# Patient Record
Sex: MALE | Race: WHITE | NOT HISPANIC OR LATINO | URBAN - METROPOLITAN AREA
[De-identification: names, ages, dates, MRNs, and addresses within clinical notes are randomized per-mention and may not be internally consistent; named-entity substitution may affect disease eponyms.]

---

## 2017-05-24 ENCOUNTER — EMERGENCY (EMERGENCY)
Facility: HOSPITAL | Age: 52
LOS: 1 days | Discharge: HOME | End: 2017-05-24
Attending: EMERGENCY MEDICINE | Admitting: EMERGENCY MEDICINE
Payer: MEDICAID

## 2017-05-24 VITALS
RESPIRATION RATE: 18 BRPM | HEART RATE: 92 BPM | TEMPERATURE: 99 F | DIASTOLIC BLOOD PRESSURE: 92 MMHG | SYSTOLIC BLOOD PRESSURE: 157 MMHG | WEIGHT: 156.97 LBS | OXYGEN SATURATION: 97 %

## 2017-05-24 DIAGNOSIS — L30.9 DERMATITIS, UNSPECIFIED: ICD-10-CM

## 2017-05-24 DIAGNOSIS — Z79.899 OTHER LONG TERM (CURRENT) DRUG THERAPY: ICD-10-CM

## 2017-05-24 DIAGNOSIS — R21 RASH AND OTHER NONSPECIFIC SKIN ERUPTION: ICD-10-CM

## 2017-05-24 PROCEDURE — 99283 EMERGENCY DEPT VISIT LOW MDM: CPT

## 2017-05-24 NOTE — ED PROVIDER NOTE - OBJECTIVE STATEMENT
pt to ed co rash over arms and hands peeling cracked and excoriated no bleeding no infection no DC going on for several months but worse after recent job as  using water no fever no dizzy no headache no chills no NVD no chest pain no SOB no shakes no aches no other  injury no other complaints

## 2017-05-24 NOTE — ED PROVIDER NOTE - MEDICAL DECISION MAKING DETAILS
pt with eczema pt will get steroid lotion and FU derm I have discussed the discharge plan with the patient. The patient agrees with the plan, as discussed.  The patient understands Emergency Department diagnosis is a preliminary diagnosis often based on limited information and that the patient must adhere to the follow-up plan as discussed.  The patient understands that if the symptoms worsen or if prescribed medications do not have the desired/planned effect that the patient may return to the Emergency Department at any time for further evaluation and treatment.

## 2017-05-24 NOTE — ED ADULT NURSE NOTE - OBJECTIVE STATEMENT
c/o of bilateral rash to hands x 1 month. Within the last week pt reports that skin has been peeling to hands.  pt also reports rash can be irritating and itching.  + rash, redness to bilateral hands.  + skin peeling noted to palms.

## 2017-05-24 NOTE — ED ADULT TRIAGE NOTE - CHIEF COMPLAINT QUOTE
worsening rash to bilateral arms and healing sores to bilateral palms x 1 month.  Rash started on bilateral hands 1 month ago and spread up bilateral arms a few days ago.  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  Denies recent travels, fever, chills, neck pain, cough, sore throat, sob, chest pain.  No active bleeding / discharge.

## 2017-07-31 NOTE — ED PROVIDER NOTE - NS ED NOTE AC HIGH RISK COUNTRIES
[facility], [place]   Psychiatric Progress Note        Interim History:   The patient's care was discussed with the treatment team during the daily team meeting and/or staff's chart notes were reviewed.  Staff reports patient has been sleeping poorly past 2 nights but naps during the day. Rash subsided.  Responding to internal stimuli and conversing with self. Bout of irritability but no major outbursts. Compliant with Latuda but refused Seroquel and hesitant about PRNs. Paranoia and suspicious.  Preoccupied and generally keep to self. Denied SI, LOYDA and HI.  Independent with [facility].     The patient was sleeping nataliia approached. Noted that he is feeling better. Denied all symptoms but noted feeling tired. Agreed to increase Latuda but requested to changes Seroquel at bedtime to PRN. Advised to improved sleep hygiene. Also requested to get off corticosteroids as rash resolving. Denied dep and anxiety. Denied racing thoughts but acknowledged being irritable past couple of days. Denied hallucinations and paranoia.  Appetite improved.     Dicussed medications and care plan.          lurasidone  80 mg Oral Daily     predniSONE  60 mg Oral Daily    Followed by     [START ON 8/7/2017] predniSONE  50 mg Oral Daily    Followed by     [START ON 8/17/2017] predniSONE  40 mg Oral Daily    Followed by     [START ON 8/27/2017] predniSONE  30 mg Oral Daily    Followed by     [START ON 9/6/2017] predniSONE  20 mg Oral Daily    Followed by     [START ON 9/16/2017] predniSONE  10 mg Oral Daily    Followed by     [START ON 9/26/2017] predniSONE  5 mg Oral Daily     lisinopril  20 mg Oral Daily     multivitamin, therapeutic  1 tablet Oral Daily     QUEtiapine  200 mg Oral At Bedtime     triamcinolone   Topical BID          Allergies:     Allergies   Allergen Reactions     Carbamazepine Rash     Carbamazepine (Tegretol),.....Rash.          Labs:     Recent Results (from the past 24 hour(s))   Comprehensive  No metabolic panel    Collection Time: 07/31/17  7:40 AM   Result Value Ref Range    Sodium 142 133 - 144 mmol/L    Potassium 4.1 3.4 - 5.3 mmol/L    Chloride 108 94 - 109 mmol/L    Carbon Dioxide 22 20 - 32 mmol/L    Anion Gap 12 3 - 14 mmol/L    Glucose 85 70 - 99 mg/dL    Urea Nitrogen 18 7 - 30 mg/dL    Creatinine 0.59 (L) 0.66 - 1.25 mg/dL    GFR Estimate >90  Non  GFR Calc   >60 mL/min/1.7m2    GFR Estimate If Black >90   GFR Calc   >60 mL/min/1.7m2    Calcium 8.8 8.5 - 10.1 mg/dL    Bilirubin Total 0.7 0.2 - 1.3 mg/dL    Albumin 3.7 3.4 - 5.0 g/dL    Protein Total 6.5 (L) 6.8 - 8.8 g/dL    Alkaline Phosphatase 109 40 - 150 U/L    ALT 80 (H) 0 - 70 U/L    AST 21 0 - 45 U/L   CBC with platelets differential    Collection Time: 07/31/17  7:40 AM   Result Value Ref Range    WBC 10.9 4.0 - 11.0 10e9/L    RBC Count 5.20 4.4 - 5.9 10e12/L    Hemoglobin 15.1 13.3 - 17.7 g/dL    Hematocrit 45.8 40.0 - 53.0 %    MCV 88 78 - 100 fl    MCH 29.0 26.5 - 33.0 pg    MCHC 33.0 31.5 - 36.5 g/dL    RDW 13.4 10.0 - 15.0 %    Platelet Count 292 150 - 450 10e9/L    Diff Method Automated Method     % Neutrophils 33.3 %    % Lymphocytes 48.8 %    % Monocytes 12.4 %    % Eosinophils 4.5 %    % Basophils 0.4 %    % Immature Granulocytes 0.6 %    Nucleated RBCs 0 0 /100    Absolute Neutrophil 3.6 1.6 - 8.3 10e9/L    Absolute Lymphocytes 5.3 0.8 - 5.3 10e9/L    Absolute Monocytes 1.3 0.0 - 1.3 10e9/L    Absolute Eosinophils 0.5 0.0 - 0.7 10e9/L    Absolute Basophils 0.0 0.0 - 0.2 10e9/L    Abs Immature Granulocytes 0.1 0 - 0.4 10e9/L    Absolute Nucleated RBC 0.0           Psychiatric Examination:     Vitals:    07/28/17 1300 07/29/17 0800 07/30/17 0800 07/31/17 0945   BP:       Pulse:       Resp:  14 16    Temp: 99.3  F (37.4  C) 99.3  F (37.4  C) 97.6  F (36.4  C) 97.5  F (36.4  C)   TempSrc:    Tympanic   Weight:  106.6 kg (235 lb)         Weight is 234 lbs 15.99 oz  Body mass index is 29.37  kg/(m^2).    Appearance: awake, alert, dressed in hospital scrubs, appeared as age stated and mild distress  Attitude:  cooperative and guarded  Eye Contact:  good  Mood:  better and irritable.   Affect:  intensity is blunted, guarded and restricted range  Speech:  clear, coherent and normal prosody  Psychomotor Behavior:  no evidence of tardive dyskinesia, dystonia, or tics and intact station, gait and muscle tone  Throught Process:  linear and goal oriented  Associations:  no loose associations  Thought Content:  no evidence of suicidal ideation or homicidal ideation, no auditory hallucinations present, no visual hallucinations present and paranoia   Insight:  fair  Judgement:  fair  Oriented to:  time, person, and place  Attention Span and Concentration:  intact  Recent and Remote Memory:  intact    Fund of Knowledge: Adequate         Precautions:     Behavioral Orders   Procedures     Assault precautions     Code 1 - Restrict to Unit     Elopement precautions     Routine Programming     As clinically indicated     Status 15     Every 15 minutes.          Diagnoses:     1.  Schizophrenia, paranoid type. (r/o schizoaffective Bipolar type)   2.  Noncompliance, nonadherence.   3.  Cluster B with antisocial traits.  4.  DRESS, rash improved and LFT normalizing.            Plan:     Medications:  -- Saphris: was tapered off due to lack of efficacy.  -- PRNs: Vistaril for anxiety and Seroquel for restlessness and haldol with Benadryl for agitation and psychosis. PRN ativan was discontinued. Seroquel 200 mg qhs PRN for racing thoughts and insomnia.   -- Seroquel 200 mg qHS but later but continued -200 mg BID PRN for psychosis, restlessness and insomnia.    -- Latuda: started and titrated to 120 mg daily. The medication was helpful initially but later mood lability and paranoia re-emerged. Medications was gradually lowered to 40 mg daily with plan to cross taper it with Risperdal. Plan to re-titrating to a target  dose of 120-160 mg. Dosed was increased to 100 mg daily on 8/1.   -- Risperdal was started and showed great efficacy but patient later developed a skin rash. Risperdal was eventually discontinued.  The patient declined to reconsider Risperdal in future.    -- Tegretol: started and titrated to 200 mg BID, level was 5.1 on 6/11 and 6.9 on 6/16 and 5.5 on 7/12. The patient developed a rash which failed to resolved after d/c'ing Risperdal. Tegretol was suspected and discontinued on 7/20.   -- Cogentin was added at 0.5-1 mg BID PRN for EPS.     -- IM team continues to follow and monitor re: DRESS.     Legal Status and Disposition:  1.  Commitment with [facility] for Saphris, Zyprexa, haloperidol, Latuda, and Risperidone. Letter was sent to add Seroquel and remove Risperdal and Saphris from [facility]  2.  [facility] placement. Given his statements about homicidal thoughts toward neighbor. May consider discharge home if safety established. [facility] informed local police department of the threats he made earlier in the hospital stay.

## 2024-08-17 NOTE — ED ADULT TRIAGE NOTE - WEIGHT IN KG
[facility] Infectious Disease Progress Note    Dyllan Boogie Patient Status:  Inpatient    1974 MRN [id number]   Location [address] Attending Malika Giles DO   Hosp Day # 4 PCP James De La Paz DO         ASSESSMENT:   - Grp A Strep bacteremia, 1/2 blood cx   - DM2 foot ulcer (left) with clinical osteo  - Dry gangrene to right second toe  - Fever  - Acute N/V  - Recent LLE abscess s/p OR with Dr. Romberg on 3/4 and 3/11 with graft placement and s/p L hallux amp, S/P TMA 3/15.  - DM  - Acute on CKD s/p renal transplant  (on tacrolimus and mycophenolate)  - immunocompromised host    PLAN:   - continue IV Vanco, start Meropenem  for MDR Klebsiella  - stop Cefepime, Flagyl  - Wcx  MRSA, group A strep, Rare proteus mirabilis  - wound cx  with MDR Klebsiella, Proteus, Grp A strep, mixed aerobes  - F/u Bcx from admit - Group A strep in 1/2 blood cx from   ---> repeat blood cx from  NGTD.  - f/u OR cx   - ECHO negative for vegetation, may need PATRICIA if additional Bcx positive.   - MRI left foot w/ osteomyelitis  - f/u path  - appreciate input of Dr. Rebollar  - Wound care per Dr. Romberg  - CTM labs/vitals  - labs/imaging/micro reviewed  - d/w ID attending  - will follow        Subjective:  Pt seen and examined  No fevers, stable   S/p TMA revision         Past Medical History  Past Medical History:   Diagnosis Date    Anemia     Blind right eye     Chronic kidney disease     Dyslipidemia 2022    Essential hypertension 2019    Hypercalcemia 2022    Kidney transplant recipient (CMD) 2021    Retinopathy due to secondary diabetes  (CMD)     Type 2 diabetes mellitus  (CMD) 2022        Surgical History  Past Surgical History:   Procedure Laterality Date    Amputation Left 2019    All 5 toes amputation    Appendectomy      Av fistula placement Left     Eye surgery      Kidney transplant      Retinopathy surgery Right      Rotator cuff repair Left 2012    Toe amputation Right 2023    5th toe        Social History  Social History     Tobacco Use    Smoking status: Former     Current packs/day: 0.00     Average packs/day: 0.3 packs/day for 17.0 years (5.1 ttl pk-yrs)     Types: Cigarettes     Start date:      Quit date:      Years since quittin.6     Passive exposure: Past    Smokeless tobacco: Never   Vaping Use    Vaping status: never used   Substance Use Topics    Alcohol use: Yes     Comment: occasionally 3-4 times yearly - 2024    Drug use: Yes     Types: Marijuana     Comment: occasionally - 2 weeks ago       Family History    Family History   Problem Relation Age of Onset    Diabetes Mother     Hypertension Mother     COPD Mother     COPD Father     Diabetes Father     Kidney disease Father     Coronary Artery Disease Father     Multiple myeloma Father     Dialysis/ESRD Father     Diabetes Brother        Allergies:  ALLERGIES:   Allergen Reactions    Beef Allergy   (Food Or Med) Other (See Comments)     Pt preference    Pork Allergy   (Food Or Med) Other (See Comments)     Pt preference       Medications:  Current Facility-Administered Medications   Medication Dose Route Frequency Provider Last Rate Last Admin    insulin lispro (ADMELOG, HumaLOG) injection 5 Units  5 Units Subcutaneous TID  Juanita Worrell PA-C   5 Units at 24 1212    insulin glargine (LANTUS) injection 15 Units  15 Units Subcutaneous Nightly Juanita Worrell PA-C        meropenem (MERREM) 1 g in sodium chloride 0.9 % 100 mL IVPB  1 g Intravenous 3 times per day Lucero Harman MD        cyanocobalamin (Vitamin B-12) tablet 1,000 mcg  1,000 mcg Oral Daily Rosana Chu PA-C   1,000 mcg at 24 0903    cyclobenzaprine (FLEXERIL) tablet 5 mg  5 mg Oral TID Rosana Chu PA-C   5 mg at 24 0903    morphine injection 2 mg  2 mg Intravenous Q3H PRN Malika Giles DO   2 mg at 24 0211    sodium chloride (NORMAL  SALINE) 0.9 % bolus 500 mL  500 mL Intravenous PRN Michaela Nguyen, DPM        sodium chloride 0.9 % flush bag 25 mL  25 mL Intravenous PRN Michaela Nguyen, DPM        sodium chloride 0.9 % injection 2 mL  2 mL Intracatheter 2 times per day Michaela Nguyen DPM   2 mL at 08/17/24 0906    sodium chloride 0.9 % flush bag 25 mL  25 mL Intravenous PRN Michaela Nguyen DPM        heparin (porcine) injection 5,000 Units  5,000 Units Subcutaneous 3 times per day Malika Giles DO   5,000 Units at 08/15/24 2106    acetaminophen (TYLENOL) tablet 650 mg  650 mg Oral Q4H PRN Michaela Nguyen DPM        Or    acetaminophen (TYLENOL) suppository 650 mg  650 mg Rectal Q4H PRN Michaela Nguyen, DPM        ondansetron (ZOFRAN ODT) disintegrating tablet 4 mg  4 mg Oral Q12H PRN Michaela Nguyen DPM        Or    ondansetron (ZOFRAN) injection 4 mg  4 mg Intravenous Q12H PRN Michaela Nguyen, DPM        polyethylene glycol (MIRALAX) packet 17 g  17 g Oral Daily PRN Michaela Nguyen DPM        HYDROcodone-acetaminophen (NORCO) 5-325 MG per tablet 1 tablet  1 tablet Oral Q4H PRN Michaela Nguyen, DPM   1 tablet at 08/14/24 0848    VANCOMYCIN - PHARMACIST MONITORED Misc   Does not apply See Admin Instructions Michaela Nguyen DPKENDALL        amLODIPine (NORVASC) tablet 5 mg  5 mg Oral Daily Michaela Nguyen DPM   5 mg at 08/17/24 0904    gabapentin (NEURONTIN) capsule 100 mg  100 mg Oral QHS PRN Michaela Nguyen, DPM        metoPROLOL tartrate (LOPRESSOR) tablet 50 mg  50 mg Oral 2 times per day Michaela Nguyen DPM   50 mg at 08/17/24 0903    mycophenolate (CELLCEPT) capsule 750 mg  750 mg Oral BID Michaela Nguyen, DPM   750 mg at 08/17/24 0903    rosuvastatin (CRESTOR) tablet 10 mg  10 mg Oral Nightly Michaela Nguyen DPM   10 mg at 08/16/24 2015    TACROlimus (PROGRAF) capsule 2 mg  2 mg Oral BID Michaela Nguyen DPM   2 mg at 08/17/24 0903    dextrose 50 % injection 25 g  25 g Intravenous PRN Michaela Nguyen DPM        dextrose 50 % injection 12.5 g  12.5 g Intravenous PRN Michaela Nguyen DPM         glucagon (GLUCAGEN) injection 1 mg  1 mg Intramuscular PRN PatrickHéctorin M, DPM        dextrose (GLUTOSE) 40 % gel 15 g  15 g Oral PRN Patrick Michaela M, DPM        dextrose (GLUTOSE) 40 % gel 30 g  30 g Oral PRN Patrick, Michaela M, DPM        insulin lispro (ADMELOG,HumaLOG) - Correction Dose   Subcutaneous TID WC Patrick Michaela M, DPM   1 Units at 08/17/24 0902    insulin lispro (ADMELOG,HumaLOG) - Correction Dose   Subcutaneous Nightly Patrick Michaela M, DPM        vancomycin (VANCOCIN) 1,000 mg in sodium chloride 0.9 % 250 mL IVPB  1,000 mg Intravenous QHS Michaela Nguyen M, .7 mL/hr at 08/16/24 2358 1,000 mg at 08/16/24 2358        Review of Systems    Negative except listed in hpi    Physical Exam:   Weight:   Wt Readings from Last 3 Encounters:   08/14/24 105.4 kg (232 lb 5.8 oz)   08/14/24 105.4 kg (232 lb 5.8 oz)   07/08/24 105.5 kg (232 lb 9.6 oz)   , Weight change:   Height:   Ht Readings from Last 3 Encounters:   08/14/24 6' 3\" (1.905 m)   08/14/24 6' 3\" (1.905 m)   08/05/24 6' 3\" (1.905 m)          Vital Last Value 24 Hour Range   Temperature 97.5 °F (36.4 °C) (08/17/24 1128) Temp  Min: 97.5 °F (36.4 °C)  Max: 98.7 °F (37.1 °C)   Pulse 72 (08/17/24 1128) Pulse  Min: 72  Max: 88   Respiratory 18 (08/17/24 1128) Resp  Min: 14  Max: 18   Non-Invasive  Blood Pressure (!) 155/90 (08/17/24 1128) BP  Min: 116/77  Max: 155/90   Pulse Oximetry 100 % (08/17/24 1128) SpO2  Min: 97 %  Max: 100 %   Arterial   Blood Pressure   No data recorded       GENERAL: appears stated age, in no distress, and normal affect  LYMPH NODES: no cervical adenopathy, no supraclavicular adenopathy, no axillary adenopathy, no inguinal adenopathy  SKIN: normal color, normal texture, normal turgor, no skin rashes, no atypical appearing skin lesions, and no bruises  HEENT: PERRLA to left, right eye opacified, no icterus, no CLAN, supple  CARDIOVASCULAR: Regular rate and rhythm, no murmurs, rubs or gallups  LUNGS: lungs are clear to auscultation with  normal inspiratory/expiratory sounds, no rales, no rhonchi, and no wheezes  ABDOMEN: abdomen is soft, normal active bowel sounds, nontender, and without masses  GENITOURINARY: deferred  BACK: inspection shows no curvature, no discomfort to palpation of the midline, and no costovertebral angle discomfort to palpation  NEUROLOGIC: alert and oriented, motor strength and sensation normal  EXTREMITIES: no clubbing, no cyanosis, Left foot ulceration to first MT with seropurulent drainage, + bone exposure. Lateral ulcer with scant bloody drainage. --> s/p TMA revision, currently wrapped in surgical dressing. Right foot s/p 5th toe amp. Dry gangrene changes to 2nd toe.     Results:  Labs:     CBC  Recent Labs     08/15/24  0337 08/16/24  0327 08/17/24  0527   WBC 12.7* 8.7 9.6   HGB 9.3* 9.9* 9.6*   HCT 29.1* 32.0* 30.8*    232 232   MCV 89.0 91.2 90.9       CMP  Recent Labs   Lab 08/17/24  0527 08/16/24  0327 08/15/24  0337   SODIUM 135 135 134*   POTASSIUM 4.4 4.3 4.1   CHLORIDE 107 106 104   CO2 16* 19* 19*   BUN 31* 38* 37*   CREATININE 1.73* 2.10* 2.27*   GLUCOSE 164* 177* 106*   CALCIUM 9.6 9.7 9.8     Recent Labs   Lab 08/14/24  0220 08/13/24  1620   ALBUMIN 2.6* 3.0*   BILIRUBIN 0.8 1.0   AST 19 17   GPT 21 24       Imaging:  US [facility] JAG 1 OR 2 LEVEL LEFT    Result Date: 8/14/2024  PROCEDURE: US [facility] LOWER EXTREMITY ARTERIES DUPLEX BILATERAL, US VAS JAG 1 OR 2 LEVEL LEFT HISTORY: Leg wound. TECHNIQUE: Bilateral lower extremity arterial vascular ultrasound. Color doppler flow and spectral waveforms obtained. Limited physiologic arterial evaluation of the left lower extremity for determination of ankle-brachial index. Doppler and pulse volume recording was performed. COMPARISON: Lower extremity RIGHT LOWER EXTREMITY FINDINGS: Waveform Analysis: Distal External Iliac Artery: Antegrade flow. Multiphasic, high resistive waveforms. Common Femoral Artery: Antegrade flow. Multiphasic, high resistive waveforms. Deep  Femoral Artery: Antegrade flow. Multiphasic, high resistive waveforms. Superficial Femoral Artery: Antegrade flow. Multiphasic, high resistive waveforms. Popliteal Artery: Antegrade flow. Multiphasic, high resistive waveforms. Tibioperoneal Trunk: Antegrade flow. Multiphasic, high resistive waveforms. Posterior Tibial Artery: Antegrade flow. Multiphasic, high resistive waveforms. Peroneal Artery: Antegrade flow. Multiphasic, high resistive waveforms. Anterior Tibial Artery: Antegrade flow. Multiphasic, high resistive waveforms. Peak Systolic Velocities: Distal External Iliac Artery: 128 cm/s Common Femoral Artery (proximal): 102 cm/s Common Femoral Artery (distal): 92 cm/s Deep Femoral Artery: 79 cm/s Superficial Femoral Artery (origin): 102 cm/s Superficial Femoral Artery (proximal): 95 cm/s Superficial Femoral Artery (mid): 79 cm/s Superficial Femoral Artery (distal): 80 cm/s Popliteal Artery (proximal): 76 cm/s Popliteal Artery (distal): 103 cm/s Tibioperoneal Trunk: 87 cm/s Posterior Tibial Artery (proximal): 93 cm/s Posterior Tibial Artery (distal): 71 cm/s Peroneal Artery (proximal): 56 cm/s Peroneal Artery (distal): 40 cm/s Anterior Tibial Artery (proximal): 73 cm/s Anterior Tibial Artery (distal): 88 cm/s LEFT LOWER EXTREMITY FINDINGS: Waveform Analysis: Distal External Iliac Artery: Antegrade flow. Multiphasic, high resistive waveforms. Common Femoral Artery: Antegrade flow. Multiphasic, high resistive waveforms. Deep Femoral Artery: Antegrade flow. Multiphasic, high resistive waveforms. Superficial Femoral Artery: Antegrade flow. Multiphasic, high resistive waveforms. Popliteal Artery: Antegrade flow. Multiphasic, high resistive waveforms. Tibioperoneal Trunk: Antegrade flow. Multiphasic, high resistive waveforms. Posterior Tibial Artery: Antegrade flow. Multiphasic, high resistive waveforms. Peroneal Artery: Antegrade flow. Multiphasic, high resistive waveforms. Anterior Tibial Artery: Antegrade flow.  Multiphasic, high resistive waveforms. Peak Systolic Velocities: Distal External Iliac Artery: 100 cm/s Common Femoral Artery (proximal): 92 cm/s Common Femoral Artery (distal): 89 cm/s Deep Femoral Artery: 73 cm/s Superficial Femoral Artery (origin): 108 cm/s Superficial Femoral Artery (proximal): 124 cm/s Superficial Femoral Artery (mid): 110 cm/s Superficial Femoral Artery (distal): 88 cm/s Popliteal Artery (proximal): 68 cm/s Popliteal Artery (distal): 134 cm/s Tibioperoneal Trunk: 108 cm/s Posterior Tibial Artery (proximal): 111 cm/s Posterior Tibial Artery (distal): 121 cm/s Peroneal Artery (proximal): 79 cm/s Peroneal Artery (distal): 82 cm/s Anterior Tibial Artery (proximal): 113 cm/s Anterior Tibial Artery (distal): 133 cm/s Peak systolic blood pressures and indices as below. (JAG = ankle-brachial index. TBI = toe-brachial index) Right Brachial Artery: 126 mm Hg Left Brachial Artery: 131 mm Hg Left Posterior Tibial Artery: >254 mm Hg (JAG: NC) Left Dorsalis Pedis Artery: 170 mm Hg (JAG: 1.27) Status post toe amputation.     1. No evidence of arterial occlusion or hemodynamically significant stenosis. 2. Limited left JAG due to calcified/noncompressible vessels. Normal left DP JAG. Status post left toe amputations. Reference (from [facility] Vascular Testing Criteria): *   <50% diameter reduction: Velocity ratio < 2. Multiphasic waveforms. *   50-74% diameter reduction: Velocity ratio 2-4. Monophasic waveforms (sharp proximally and dampened distally). *   >75% diameter reduction: Velocity ratio > 4. Monophasic waveforms (sharp proximally and dampened distally). JAG: *   Normal: 0.91 - 1.40 *   Mild ischemia: 0.85 - 0.90 *   Moderate ischemia: 0.50 - 0.84 *   Severe ischemia: 0.26 - 0.49 *   Severe ischemia / limb salvage: </= 0.25 *   Noncompressible / calcified arteries: >1.4   *In general, JAG > 0.5 indicates single level obstructive disease, while JAG < 0.5 indicates multiple level  obstructive disease TBI: *   Normal: >0.7 *   Mild ischemia: 0.5 - 0.7 *   Moderate ischemia: 0.35 - 0.5 *   Moderate-severe ischemia (likely to heal): <0.35 with toe pressure >45 mmHg *   Severe ischemia (unlikely to heal): <0.35 with toe pressure <30 mmHg Electronically Signed by: VIRGINIE IVERSON MD Signed on: 8/14/2024 7:09 AM Workstation ID: [id number]    US VASC LOWER EXTREMITY ARTERIES DUPLEX BILATERAL    Result Date: 8/14/2024  PROCEDURE: US VASC LOWER EXTREMITY ARTERIES DUPLEX BILATERAL, US VASC JAG 1 OR 2 LEVEL LEFT HISTORY: Leg wound. TECHNIQUE: Bilateral lower extremity arterial vascular ultrasound. Color doppler flow and spectral waveforms obtained. Limited physiologic arterial evaluation of the left lower extremity for determination of ankle-brachial index. Doppler and pulse volume recording was performed. COMPARISON: Lower extremity RIGHT LOWER EXTREMITY FINDINGS: Waveform Analysis: Distal External Iliac Artery: Antegrade flow. Multiphasic, high resistive waveforms. Common Femoral Artery: Antegrade flow. Multiphasic, high resistive waveforms. Deep Femoral Artery: Antegrade flow. Multiphasic, high resistive waveforms. Superficial Femoral Artery: Antegrade flow. Multiphasic, high resistive waveforms. Popliteal Artery: Antegrade flow. Multiphasic, high resistive waveforms. Tibioperoneal Trunk: Antegrade flow. Multiphasic, high resistive waveforms. Posterior Tibial Artery: Antegrade flow. Multiphasic, high resistive waveforms. Peroneal Artery: Antegrade flow. Multiphasic, high resistive waveforms. Anterior Tibial Artery: Antegrade flow. Multiphasic, high resistive waveforms. Peak Systolic Velocities: Distal External Iliac Artery: 128 cm/s Common Femoral Artery (proximal): 102 cm/s Common Femoral Artery (distal): 92 cm/s Deep Femoral Artery: 79 cm/s Superficial Femoral Artery (origin): 102 cm/s Superficial Femoral Artery (proximal): 95 cm/s Superficial Femoral Artery (mid): 79 cm/s Superficial Femoral  Artery (distal): 80 cm/s Popliteal Artery (proximal): 76 cm/s Popliteal Artery (distal): 103 cm/s Tibioperoneal Trunk: 87 cm/s Posterior Tibial Artery (proximal): 93 cm/s Posterior Tibial Artery (distal): 71 cm/s Peroneal Artery (proximal): 56 cm/s Peroneal Artery (distal): 40 cm/s Anterior Tibial Artery (proximal): 73 cm/s Anterior Tibial Artery (distal): 88 cm/s LEFT LOWER EXTREMITY FINDINGS: Waveform Analysis: Distal External Iliac Artery: Antegrade flow. Multiphasic, high resistive waveforms. Common Femoral Artery: Antegrade flow. Multiphasic, high resistive waveforms. Deep Femoral Artery: Antegrade flow. Multiphasic, high resistive waveforms. Superficial Femoral Artery: Antegrade flow. Multiphasic, high resistive waveforms. Popliteal Artery: Antegrade flow. Multiphasic, high resistive waveforms. Tibioperoneal Trunk: Antegrade flow. Multiphasic, high resistive waveforms. Posterior Tibial Artery: Antegrade flow. Multiphasic, high resistive waveforms. Peroneal Artery: Antegrade flow. Multiphasic, high resistive waveforms. Anterior Tibial Artery: Antegrade flow. Multiphasic, high resistive waveforms. Peak Systolic Velocities: Distal External Iliac Artery: 100 cm/s Common Femoral Artery (proximal): 92 cm/s Common Femoral Artery (distal): 89 cm/s Deep Femoral Artery: 73 cm/s Superficial Femoral Artery (origin): 108 cm/s Superficial Femoral Artery (proximal): 124 cm/s Superficial Femoral Artery (mid): 110 cm/s Superficial Femoral Artery (distal): 88 cm/s Popliteal Artery (proximal): 68 cm/s Popliteal Artery (distal): 134 cm/s Tibioperoneal Trunk: 108 cm/s Posterior Tibial Artery (proximal): 111 cm/s Posterior Tibial Artery (distal): 121 cm/s Peroneal Artery (proximal): 79 cm/s Peroneal Artery (distal): 82 cm/s Anterior Tibial Artery (proximal): 113 cm/s Anterior Tibial Artery (distal): 133 cm/s Peak systolic blood pressures and indices as below. (JAG = ankle-brachial index. TBI = toe-brachial index) Right Brachial  Artery: 126 mm Hg Left Brachial Artery: 131 mm Hg Left Posterior Tibial Artery: >254 mm Hg (JAG: NC) Left Dorsalis Pedis Artery: 170 mm Hg (JAG: 1.27) Status post toe amputation.     1. No evidence of arterial occlusion or hemodynamically significant stenosis. 2. Limited left JAG due to calcified/noncompressible vessels. Normal left DP JAG. Status post left toe amputations. Reference (from [facility] Vascular Testing Criteria): *   <50% diameter reduction: Velocity ratio < 2. Multiphasic waveforms. *   50-74% diameter reduction: Velocity ratio 2-4. Monophasic waveforms (sharp proximally and dampened distally). *   >75% diameter reduction: Velocity ratio > 4. Monophasic waveforms (sharp proximally and dampened distally). JAG: *   Normal: 0.91 - 1.40 *   Mild ischemia: 0.85 - 0.90 *   Moderate ischemia: 0.50 - 0.84 *   Severe ischemia: 0.26 - 0.49 *   Severe ischemia / limb salvage: </= 0.25 *   Noncompressible / calcified arteries: >1.4   *In general, JAG > 0.5 indicates single level obstructive disease, while JAG < 0.5 indicates multiple level obstructive disease TBI: *   Normal: >0.7 *   Mild ischemia: 0.5 - 0.7 *   Moderate ischemia: 0.35 - 0.5 *   Moderate-severe ischemia (likely to heal): <0.35 with toe pressure >45 mmHg *   Severe ischemia (unlikely to heal): <0.35 with toe pressure <30 mmHg Electronically Signed by: VIRGINIE IVERSON MD Signed on: 8/14/2024 7:09 AM Workstation ID: [id number]    CT FOOT WO CONTRAST LEFT    Result Date: 8/13/2024  EXAM: CT FOOT WO CONTRAST LEFT CLINICAL INDICATION: Foot ulcer. Drainage. Rule out osteomyelitis. COMPARISON: Same day prior foot radiographs. TECHNIQUE: Noncontrast CT images of the left foot were obtained. Multiplanar reformats obtained. mA and/or kVp was adjusted for patient size. FINDINGS/    Diffuse osseous demineralization. Degenerative changes of the midfoot. There is extensive skin thickening and subcutaneous/soft tissue edema of the included  foot, greatest along the lateral aspect of the lower extremity and dorsal aspect of the foot. Small focal skin defect along the lateral malleolus (4-18). Additional open wound with bubbly lucencies involving the anterior medial aspect of the midfoot (4-101), may reflect superficial ulcerative skin changes. Scattered vascular calcifications. Status post amputation at the level of the metatarsals throughout the digits. Scattered areas of nonspecific periosteal thickening involving the fourth and fifth metatarsals and lateral malleolus (6-30), which may be secondary to chronic overlying skin thickening or fluid and subsequent osteitis, though developing infection is not excluded. Cortical irregularity of the distal aspect of the first metatarsal (6-202) with focal osteopenia, may reflect developing area of infection/osteomyelitis. Clinical correlation recommended. Further evaluation with a foot MRI could be obtained if clinically warranted. Electronically Signed by: ESTRELLITA PRAJAPATI MD Signed on: 8/13/2024 10:13 PM Workstation ID: [id number]    XR FOOT 2 VIEWS LEFT    Result Date: 8/13/2024  PROCEDURE:  XR FOOT 2 VIEWS LEFT CLINICAL INDICATION: Foot wrapped. Bandages wet.     ro osteo infection                                                                                                                          COMPARISON: 3/16/2024 foot radiograph.     FINDINGS/IMPRESSION:  3 views left foot were obtained. Radiograph demonstrate decrease osseous mineralization. Status post amputation at the level of the metatarsal throughout the digits. There is soft tissue swelling throughout the foot, predominant involving the midfoot. Subtle areas of possible ulceration involving the soft tissues overlying the first MTP. The alignment is unremarkable. No definite fractures. Nonspecific mild periosteal reaction overlying the stump of the first metatarsal bone and fifth metatarsal bone without underlying erosive changes or cortical  irregularity.     Electronically Signed by: ESTRELLITA PRAJAPATI MD Signed on: 8/13/2024 5:26 PM Workstation ID: [id number]    XR CHEST AP OR PA    Result Date: 8/13/2024  Exam: XR CHEST AP OR PA. Indication: Pt unsteady for 2 view         Chest Pain              Comparison: 9/9/2023 chest radiograph. Findings: Single view of the chest demonstrates normal cardiomediastinal silhouette. No obvious consolidation or large pleural effusions. No definite pneumothorax. No acute osseous abnormality     Impression: No radiographic evidence of acute cardiopulmonary disease on this single frontal view chest radiograph. Electronically Signed by: ESTRELLITA PRAJAPATI MD Signed on: 8/13/2024 4:39 PM Workstation ID: [id number]    MRI L foot, 8/14:  IMPRESSION:  1.   Status post transmetatarsal amputation with ulceration at the  operative bed and suspected large soft tissue abscess extending along the  second and third metatarsal shafts. Evaluation is limited due to lack of IV  contrast.  2.   Acute osteomyelitis throughout the residual fifth metatarsal.  3.   Early osteomyelitis at the great toe metatarsal osteotomy site.  4.   Reactive osteitis at the remaining second through fourth metatarsal  shafts.      Microbiology:  8/13 Bcx 1/2 Strep pyogenes  8/14 Wcx: Moderate GAS, MRSA, Proteus, diphtheroids  8/14 Bcx: NGTD      [facility], [facility]  [phone]  8/17/2024  2:25 PM    71.2